# Patient Record
Sex: FEMALE | Race: BLACK OR AFRICAN AMERICAN | NOT HISPANIC OR LATINO | Employment: FULL TIME | URBAN - METROPOLITAN AREA
[De-identification: names, ages, dates, MRNs, and addresses within clinical notes are randomized per-mention and may not be internally consistent; named-entity substitution may affect disease eponyms.]

---

## 2017-10-25 PROBLEM — F44.4 FUNCTIONAL NEUROLOGICAL SYMPTOM DISORDER WITH WEAKNESS OR PARALYSIS: Status: ACTIVE | Noted: 2017-10-25

## 2019-01-28 PROBLEM — M25.519 ACUTE SHOULDER PAIN: Status: RESOLVED | Noted: 2019-01-28 | Resolved: 2019-01-28

## 2019-01-28 PROBLEM — M25.519 ACUTE SHOULDER PAIN: Status: ACTIVE | Noted: 2019-01-28

## 2019-01-28 PROBLEM — M25.512 ACUTE PAIN OF LEFT SHOULDER: Status: RESOLVED | Noted: 2019-01-28 | Resolved: 2019-01-28

## 2020-07-02 ENCOUNTER — OFFICE VISIT (OUTPATIENT)
Dept: GASTROENTEROLOGY | Facility: CLINIC | Age: 40
End: 2020-07-02
Payer: MEDICAID

## 2020-07-02 VITALS
HEIGHT: 65 IN | HEART RATE: 85 BPM | WEIGHT: 293 LBS | BODY MASS INDEX: 48.82 KG/M2 | DIASTOLIC BLOOD PRESSURE: 70 MMHG | SYSTOLIC BLOOD PRESSURE: 118 MMHG

## 2020-07-02 DIAGNOSIS — Z01.818 PREOPERATIVE EXAMINATION: ICD-10-CM

## 2020-07-02 DIAGNOSIS — K59.09 OTHER CONSTIPATION: ICD-10-CM

## 2020-07-02 DIAGNOSIS — K21.9 GASTROESOPHAGEAL REFLUX DISEASE, ESOPHAGITIS PRESENCE NOT SPECIFIED: Primary | ICD-10-CM

## 2020-07-02 PROBLEM — M54.50 LOW BACK PAIN: Status: ACTIVE | Noted: 2020-07-02

## 2020-07-02 PROBLEM — J45.30 MILD PERSISTENT ASTHMA: Status: ACTIVE | Noted: 2019-07-31

## 2020-07-02 PROBLEM — G56.00 CARPAL TUNNEL SYNDROME: Status: ACTIVE | Noted: 2020-05-12

## 2020-07-02 PROBLEM — D50.9 MICROCYTIC ANEMIA: Status: ACTIVE | Noted: 2020-07-02

## 2020-07-02 PROBLEM — E66.01 MORBID OBESITY WITH BMI OF 50.0-59.9, ADULT: Status: ACTIVE | Noted: 2020-07-02

## 2020-07-02 PROCEDURE — 99204 OFFICE O/P NEW MOD 45 MIN: CPT | Mod: S$PBB,,, | Performed by: NURSE PRACTITIONER

## 2020-07-02 PROCEDURE — 99999 PR PBB SHADOW E&M-EST. PATIENT-LVL IV: ICD-10-PCS | Mod: PBBFAC,,, | Performed by: NURSE PRACTITIONER

## 2020-07-02 PROCEDURE — 99999 PR PBB SHADOW E&M-EST. PATIENT-LVL IV: CPT | Mod: PBBFAC,,, | Performed by: NURSE PRACTITIONER

## 2020-07-02 PROCEDURE — 99204 PR OFFICE/OUTPT VISIT, NEW, LEVL IV, 45-59 MIN: ICD-10-PCS | Mod: S$PBB,,, | Performed by: NURSE PRACTITIONER

## 2020-07-02 PROCEDURE — 99214 OFFICE O/P EST MOD 30 MIN: CPT | Mod: PBBFAC,PO | Performed by: NURSE PRACTITIONER

## 2020-07-02 RX ORDER — MEDROXYPROGESTERONE ACETATE 150 MG/ML
INJECTION, SUSPENSION INTRAMUSCULAR
COMMUNITY
Start: 2020-04-20

## 2020-07-02 RX ORDER — FLUTICASONE PROPIONATE 220 UG/1
AEROSOL, METERED RESPIRATORY (INHALATION)
COMMUNITY
Start: 2020-05-12 | End: 2022-06-06

## 2020-07-02 RX ORDER — CIPROFLOXACIN 500 MG/5ML
KIT ORAL
COMMUNITY
Start: 2020-06-23 | End: 2020-08-07

## 2020-07-02 RX ORDER — ALBUTEROL SULFATE 108 UG/1
AEROSOL, METERED RESPIRATORY (INHALATION)
COMMUNITY
Start: 2020-03-26 | End: 2024-02-09

## 2020-07-02 RX ORDER — PANTOPRAZOLE SODIUM 40 MG/1
40 TABLET, DELAYED RELEASE ORAL DAILY
Qty: 30 TABLET | Refills: 11 | Status: SHIPPED | OUTPATIENT
Start: 2020-07-02 | End: 2020-12-12 | Stop reason: CLARIF

## 2020-07-02 NOTE — PROGRESS NOTES
Subjective:       Patient ID: Nanci Bermudez is a 40 y.o. female.    Chief Complaint: Abdominal Pain    39 y/o female referred by PCP for abdominal pain. Patient reports worsening generalized abdominal pain over the past year. Reports burning sensation in upper abdominal region with occasional lower abdominal cramping. Reports occasional heartburn and reflux. Denies dysphagia, n/v, fever, or weight loss. She has history of constipation and has tried OTC laxatives and enemas without much relief. States BMs are usually hard, pebble-like. No bloody or mucus noted.  No previous GI workup.       Past Medical History:   Diagnosis Date    Acute shoulder pain 1/28/2019       History reviewed. No pertinent surgical history.    Family History   Problem Relation Age of Onset    No Known Problems Mother     No Known Problems Father        Social History     Socioeconomic History    Marital status: Single     Spouse name: Not on file    Number of children: Not on file    Years of education: Not on file    Highest education level: Not on file   Occupational History    Not on file   Social Needs    Financial resource strain: Not on file    Food insecurity     Worry: Not on file     Inability: Not on file    Transportation needs     Medical: Not on file     Non-medical: Not on file   Tobacco Use    Smoking status: Never Smoker    Smokeless tobacco: Never Used   Substance and Sexual Activity    Alcohol use: Never     Frequency: Never    Drug use: Never    Sexual activity: Yes   Lifestyle    Physical activity     Days per week: Not on file     Minutes per session: Not on file    Stress: Not on file   Relationships    Social connections     Talks on phone: Not on file     Gets together: Not on file     Attends [employer] service: Not on file     Active member of club or organization: Not on file     Attends meetings of clubs or organizations: Not on file     Relationship status: Not on file   Other Topics Concern    Not  "on file   Social History Narrative    Not on file       Review of Systems   Constitutional: Negative for fatigue, fever and unexpected weight change.   HENT: Negative for sore throat and trouble swallowing.    Respiratory: Negative for cough and shortness of breath.    Cardiovascular: Negative for chest pain.   Gastrointestinal: Positive for abdominal pain and constipation. Negative for blood in stool.   Musculoskeletal: Negative for myalgias.   Allergic/Immunologic: Negative for food allergies.   Neurological: Negative for dizziness and weakness.   Hematological: Negative for adenopathy. Does not bruise/bleed easily.   Psychiatric/Behavioral: Negative for dysphoric mood.         Objective:     Vitals:    07/02/20 0800   BP: 118/70   Pulse: 85   Weight: (!) 162.1 kg (357 lb 4.8 oz)   Height: 5' 5" (1.651 m)          Physical Exam  Constitutional:       General: She is not in acute distress.     Appearance: She is obese. She is not ill-appearing.   HENT:      Head: Normocephalic.   Eyes:      Conjunctiva/sclera: Conjunctivae normal.      Pupils: Pupils are equal, round, and reactive to light.   Cardiovascular:      Rate and Rhythm: Normal rate and regular rhythm.   Pulmonary:      Effort: Pulmonary effort is normal.      Breath sounds: Normal breath sounds.   Abdominal:      General: Bowel sounds are normal.      Palpations: Abdomen is soft.      Tenderness: There is no abdominal tenderness.      Comments: Obese abdomen   Musculoskeletal: Normal range of motion.   Skin:     General: Skin is warm and dry.   Neurological:      Mental Status: She is alert and oriented to person, place, and time.   Psychiatric:         Mood and Affect: Mood normal.         Thought Content: Thought content normal.               Assessment:         ICD-10-CM ICD-9-CM   1. Gastroesophageal reflux disease, esophagitis presence not specified  K21.9 530.81   2. Other constipation  K59.09 564.09   3. Preoperative examination  Z01.818 V72.84 "       Plan:       Gastroesophageal reflux disease, esophagitis presence not specified  -     pantoprazole (PROTONIX) 40 MG tablet; Take 1 tablet (40 mg total) by mouth once daily.  Dispense: 30 tablet; Refill: 11  -     Case request GI: EGD (ESOPHAGOGASTRODUODENOSCOPY)    Other constipation  -     linaCLOtide (LINZESS) 145 mcg Cap capsule; Take 1 capsule (145 mcg total) by mouth once daily.  Dispense: 30 capsule; Refill: 11    Preoperative examination  -     COVID-19 Routine Screening; Future      Follow up if symptoms worsen or fail to improve.     Patient's Medications   New Prescriptions    LINACLOTIDE (LINZESS) 145 MCG CAP CAPSULE    Take 1 capsule (145 mcg total) by mouth once daily.    PANTOPRAZOLE (PROTONIX) 40 MG TABLET    Take 1 tablet (40 mg total) by mouth once daily.   Previous Medications    ALBUTEROL (ACCUNEB) 0.63 MG/3 ML NEBU    Take 3 mLs (0.63 mg total) by nebulization every 6 (six) hours as needed. Rescue    CIPROFLOXACIN (CIPRO) 500 MG/5 ML SUSPENSION        FLUTICASONE PROPIONATE (FLOVENT HFA) 220 MCG/ACTUATION INHALER        IBUPROFEN 800 MG/200 ML (4 MG/ML) PGBK    ibuprofen 800 mg tablet   TAKE 1 TABLET BY MOUTH THREE TIMES DAILY AS NEEDED   take with food    MEDROXYPROGESTERONE (DEPO-PROVERA) 150 MG/ML INJECTION        ONDANSETRON (ZOFRAN-ODT) 4 MG TBDL    Take 1 tablet (4 mg total) by mouth every 6 (six) hours as needed.    PHENAZOPYRIDINE (PYRIDIUM) 200 MG TABLET    Take 1 tablet (200 mg total) by mouth 3 (three) times daily. for 10 days    PROVENTIL HFA 90 MCG/ACTUATION INHALER       Modified Medications    No medications on file   Discontinued Medications    ACETAMINOPHEN (TYLENOL) 500 MG TABLET    Take 2 tablets (1,000 mg total) by mouth every 6 (six) hours as needed.

## 2020-07-02 NOTE — PATIENT INSTRUCTIONS
Abdominal Pain    Abdominal pain is pain in the stomach or belly area. Everyone has this pain from time to time. In many cases it goes away on its own. But abdominal pain can sometimes be due to a serious problem, such as appendicitis. So its important to know when to seek help.  Causes of abdominal pain  There are many possible causes of abdominal pain. Common causes in adults include:  · Constipation, diarrhea, or gas  · Stomach acid flowing back up into the esophagus (acid reflux or heartburn)  · Severe acid reflux, called GERD (gastroesophageal reflux disease)  · A sore in the lining of the stomach or small intestine (peptic ulcer)  · Inflammation of the gallbladder, liver, or pancreas  · Gallstones or kidney stones  · Appendicitis   · Intestinal blockage   · An internal organ pushing through a muscle or other tissue (hernia)  · Urinary tract infections  · In women, menstrual cramps, fibroids, or endometriosis  · Inflammation or infection of the intestines  Diagnosing the cause of abdominal pain  Your healthcare provider will do a physical exam help find the cause of your pain. If needed, tests will be ordered. Belly pain has many possible causes. So it can be hard to find the reason for your pain. Giving details about your pain can help. Tell your provider where and when you feel the pain, and what makes it better or worse. Also let your provider know if you have other symptoms such as:  · Fever  · Tiredness  · Upset stomach (nausea)  · Vomiting  · Changes in bathroom habits  Treating abdominal pain  Some causes of pain need emergency medical treatment right away. These include appendicitis or a bowel blockage. Other problems can be treated with rest, fluids, or medicines. Your healthcare provider can give you specific instructions for treatment or self-care based on what is causing your pain.  If you have vomiting or diarrhea, sip water or other clear fluids. When you are ready to eat solid foods again,  start with small amounts of easy-to-digest, low-fat foods. These include apple sauce, toast, or crackers.   When to seek medical care  Call 911 or go to the hospital right away if you:  · Cant pass stool and are vomiting  · Are vomiting blood or have bloody diarrhea or black, tarry diarrhea  · Have chest, neck, or shoulder pain  · Feel like you might pass out  · Have pain in your shoulder blades with nausea  · Have sudden, severe belly pain  · Have new, severe pain unlike any you have felt before  · Have a belly that is rigid, hard, and tender to touch  Call your healthcare provider if you have:  · Pain for more than 5 days  · Bloating for more than 2 days  · Diarrhea for more than 5 days  · A fever of 100.4°F (38.0°C) or higher, or as directed by your provider  · Pain that gets worse  · Weight loss for no reason  · Continued lack of appetite  · Blood in your stool  How to prevent abdominal pain  Here are some tips to help prevent abdominal pain:  · Eat smaller amounts of food at one time.  · Avoid greasy, fried, or other high-fat foods.  · Avoid foods that give you gas.  · Exercise regularly.  · Drink plenty of fluids.  To help prevent GERD symptoms:  · Quit smoking.  · Reduce alcohol and certain foods that increase stomach acid.  · Avoid aspirin and over-the-counter pain and fever medicines (NSAIDS or nonsteroidal anti-inflammatory drugs), if possible  · Lose extra weight.  · Finish eating at least 2 hours before you go to bed or lie down.  · Raise the head of your bed.  Date Last Reviewed: 7/1/2016  © 3614-7967 [employer]. [address] [place] [address], [place] [address]. All rights reserved. This information is not intended as a substitute for professional medical care. Always follow your healthcare professional's instructions.      EGD Prep Instructions    [facility]  [address] in [facility] Office [phone]  Endoscopy Lab [phone]    You are scheduled  for an EGD with Dr. Richard on 8/11/20 at [facility].  You will enter through the [facility] Entrance and check in at Same Day Surgery.    Nothing to eat or drink after midnight before the procedure.  You MAY brush your teeth.    You MAY take your blood pressure, heart, and seizure medication on the morning of the procedure, with a SIP of water.  Hold ALL other medications until after the procedure.    If you are on blood thinners THAT YOU HAVE BEEN INSTRUCTED TO HOLD BY YOUR DOCTOR FOR THIS PROCEDURE, then do NOT take this the morning of your EGD.  Do NOT stop these medications on your own, they must be approved to be held by your doctor.  Your EGD can NOT be done if you are on these medications.  Examples of blood thinners include: Coumadin, Aggrenox, Plavix, Pradaxa, Reapro, Pletal, Xarelto, Ticagrelor, Brilinta, Eliquis, and high dose aspirin (325 mg).  You do not have to stop baby aspirin 81 mg.    You will receive a call 2-3 days before your EGD to tell you the time to arrive.  If you have not received a call by the day before your procedure, call the Endoscopy Lab at [phone].

## 2020-07-02 NOTE — LETTER
July 2, 2020      Char Hand MD  [address] [facility]  [address]  [facility] [address]           [facility]  [facility], [place]   [facility] [address]  Phone: [phone]  Fax: [phone]          Patient: Nanci Bermudez   MR Number: [id number]   YOB: 1980   Date of Visit: 7/2/2020       Dear Dr. Char Hand:    Thank you for referring Nanci Bermudez to me for evaluation. Attached you will find relevant portions of my assessment and plan of care.    If you have questions, please do not hesitate to call me. I look forward to following Nanci Bermudez along with you.    Sincerely,    Mike Palacios, ALAINA    Enclosure  CC:  No Recipients    If you would like to receive this communication electronically, please contact [email or web address] or [phone] to request more information on [employer] Link access.    For providers and/or their staff who would like to refer a patient to [facility], please contact us through our one-stop-shop provider referral line, [facility] , at [phone].    If you feel you have received this communication in error or would no longer like to receive these types of communications, please e-mail [email or web address]

## 2020-08-08 ENCOUNTER — LAB VISIT (OUTPATIENT)
Dept: FAMILY MEDICINE | Facility: CLINIC | Age: 40
End: 2020-08-08
Payer: MEDICAID

## 2020-08-08 DIAGNOSIS — Z01.818 PREOPERATIVE EXAMINATION: ICD-10-CM

## 2020-08-08 PROCEDURE — U0003 INFECTIOUS AGENT DETECTION BY NUCLEIC ACID (DNA OR RNA); SEVERE ACUTE RESPIRATORY SYNDROME CORONAVIRUS 2 (SARS-COV-2) (CORONAVIRUS DISEASE [COVID-19]), AMPLIFIED PROBE TECHNIQUE, MAKING USE OF HIGH THROUGHPUT TECHNOLOGIES AS DESCRIBED BY CMS-2020-01-R: HCPCS

## 2020-08-09 LAB — SARS-COV-2 RNA RESP QL NAA+PROBE: NOT DETECTED

## 2020-08-11 PROBLEM — K21.9 GERD (GASTROESOPHAGEAL REFLUX DISEASE): Status: ACTIVE | Noted: 2020-08-11

## 2020-08-26 ENCOUNTER — TELEPHONE (OUTPATIENT)
Dept: GASTROENTEROLOGY | Facility: CLINIC | Age: 40
End: 2020-08-26

## 2020-08-26 DIAGNOSIS — B96.81 HELICOBACTER PYLORI GASTRITIS: Primary | ICD-10-CM

## 2020-08-26 DIAGNOSIS — K29.70 HELICOBACTER PYLORI GASTRITIS: Primary | ICD-10-CM

## 2020-08-26 RX ORDER — METRONIDAZOLE 500 MG/1
500 TABLET ORAL 3 TIMES DAILY
Qty: 42 TABLET | Refills: 0 | Status: SHIPPED | OUTPATIENT
Start: 2020-08-26 | End: 2020-09-09

## 2020-08-26 RX ORDER — OMEPRAZOLE 40 MG/1
40 CAPSULE, DELAYED RELEASE ORAL
Qty: 28 CAPSULE | Refills: 0 | Status: SHIPPED | OUTPATIENT
Start: 2020-08-26 | End: 2021-05-13

## 2020-08-26 RX ORDER — DOXYCYCLINE HYCLATE 100 MG
100 TABLET ORAL 2 TIMES DAILY
Qty: 28 TABLET | Refills: 0 | Status: SHIPPED | OUTPATIENT
Start: 2020-08-26 | End: 2020-09-09

## 2020-08-26 RX ORDER — BISMUTH SUBSALICYLATE 262 MG/1
2 TABLET ORAL
Qty: 112 TABLET | Refills: 0 | Status: SHIPPED | OUTPATIENT
Start: 2020-08-26 | End: 2020-09-09

## 2020-08-26 NOTE — TELEPHONE ENCOUNTER
Informed patient of positive H Pylori. Also informed patient of medications that need to be taken together and for how long to take it. Stressed the importance of this. Told patient to stop pantoprazole and take omeprazole during treatment. Patient understood.

## 2020-08-26 NOTE — TELEPHONE ENCOUNTER
Hyperplastic gastric polyp, not of concern.  HP (+).  Meds were sent to [facility] b/c it looks like the abx need PA.  I have asked them to mail to her, but she also needs to ask when they call her.  She can stop her pantoprazole during treatment and do the omeprazole instead.  Do ALL 4 of these meds together, do not miss doses.  RTC as needed.

## 2021-05-13 ENCOUNTER — OFFICE VISIT (OUTPATIENT)
Dept: PRIMARY CARE CLINIC | Facility: CLINIC | Age: 41
End: 2021-05-13
Payer: COMMERCIAL

## 2021-05-13 DIAGNOSIS — K21.9 GASTROESOPHAGEAL REFLUX DISEASE WITHOUT ESOPHAGITIS: ICD-10-CM

## 2021-05-13 DIAGNOSIS — D50.9 MICROCYTIC ANEMIA: ICD-10-CM

## 2021-05-13 DIAGNOSIS — J01.40 ACUTE PANSINUSITIS, RECURRENCE NOT SPECIFIED: Primary | ICD-10-CM

## 2021-05-13 DIAGNOSIS — J45.30 MILD PERSISTENT ASTHMA WITHOUT COMPLICATION: ICD-10-CM

## 2021-05-13 PROCEDURE — 99214 PR OFFICE/OUTPT VISIT, EST, LEVL IV, 30-39 MIN: ICD-10-PCS | Mod: 95,,, | Performed by: FAMILY MEDICINE

## 2021-05-13 PROCEDURE — 99214 OFFICE O/P EST MOD 30 MIN: CPT | Mod: 95,,, | Performed by: FAMILY MEDICINE

## 2021-05-13 RX ORDER — CETIRIZINE HYDROCHLORIDE, PSEUDOEPHEDRINE HYDROCHLORIDE 5; 120 MG/1; MG/1
1 TABLET, FILM COATED, EXTENDED RELEASE ORAL 2 TIMES DAILY
Qty: 20 TABLET | Refills: 0 | Status: SHIPPED | OUTPATIENT
Start: 2021-05-13 | End: 2021-05-23

## 2021-05-13 RX ORDER — METHYLPREDNISOLONE 4 MG/1
TABLET ORAL
Qty: 1 PACKAGE | Refills: 0 | Status: SHIPPED | OUTPATIENT
Start: 2021-05-13 | End: 2021-08-16 | Stop reason: CLARIF

## 2022-06-03 DIAGNOSIS — J45.30 MILD PERSISTENT ASTHMA WITHOUT COMPLICATION: Primary | ICD-10-CM

## 2022-06-06 ENCOUNTER — HOSPITAL ENCOUNTER (OUTPATIENT)
Dept: RADIOLOGY | Facility: HOSPITAL | Age: 42
Discharge: HOME | End: 2022-06-06
Attending: INTERNAL MEDICINE
Payer: MEDICAID

## 2022-06-06 ENCOUNTER — HOSPITAL ENCOUNTER (OUTPATIENT)
Dept: PULMONOLOGY | Facility: CLINIC | Age: 42
Discharge: HOME | End: 2022-06-06
Payer: MEDICAID

## 2022-06-06 ENCOUNTER — OFFICE VISIT (OUTPATIENT)
Dept: PULMONOLOGY | Facility: CLINIC | Age: 42
End: 2022-06-06
Payer: MEDICAID

## 2022-06-06 VITALS — HEIGHT: 65 IN | OXYGEN SATURATION: 99 % | HEART RATE: 88 BPM | BODY MASS INDEX: 48.82 KG/M2 | WEIGHT: 293 LBS

## 2022-06-06 DIAGNOSIS — J45.20 MILD INTERMITTENT ASTHMA, UNSPECIFIED WHETHER COMPLICATED: Primary | ICD-10-CM

## 2022-06-06 DIAGNOSIS — J45.30 MILD PERSISTENT ASTHMA WITHOUT COMPLICATION: ICD-10-CM

## 2022-06-06 PROCEDURE — 94727 PR PULM FUNCTION TEST BY GAS: ICD-10-PCS | Mod: 26,S$PBB,, | Performed by: INTERNAL MEDICINE

## 2022-06-06 PROCEDURE — 94727 GAS DIL/WSHOT DETER LNG VOL: CPT | Mod: PBBFAC | Performed by: INTERNAL MEDICINE

## 2022-06-06 PROCEDURE — 94729 DIFFUSING CAPACITY: CPT | Mod: 26,S$PBB,, | Performed by: INTERNAL MEDICINE

## 2022-06-06 PROCEDURE — 94729 DIFFUSING CAPACITY: CPT | Mod: PBBFAC | Performed by: INTERNAL MEDICINE

## 2022-06-06 PROCEDURE — 71046 X-RAY EXAM CHEST 2 VIEWS: CPT | Mod: TC,FY

## 2022-06-06 PROCEDURE — 99204 PR OFFICE/OUTPT VISIT, NEW, LEVL IV, 45-59 MIN: ICD-10-PCS | Mod: S$PBB,,, | Performed by: INTERNAL MEDICINE

## 2022-06-06 PROCEDURE — 94727 GAS DIL/WSHOT DETER LNG VOL: CPT | Mod: 26,S$PBB,, | Performed by: INTERNAL MEDICINE

## 2022-06-06 PROCEDURE — 94729 PR C02/MEMBANE DIFFUSE CAPACITY: ICD-10-PCS | Mod: 26,S$PBB,, | Performed by: INTERNAL MEDICINE

## 2022-06-06 PROCEDURE — 3008F PR BODY MASS INDEX (BMI) DOCUMENTED: ICD-10-PCS | Mod: CPTII,,, | Performed by: INTERNAL MEDICINE

## 2022-06-06 PROCEDURE — 99999 PR PBB SHADOW E&M-EST. PATIENT-LVL III: CPT | Mod: PBBFAC,,, | Performed by: INTERNAL MEDICINE

## 2022-06-06 PROCEDURE — 71046 XR CHEST PA AND LATERAL: ICD-10-PCS | Mod: 26,,, | Performed by: RADIOLOGY

## 2022-06-06 PROCEDURE — 99999 PR PBB SHADOW E&M-EST. PATIENT-LVL III: ICD-10-PCS | Mod: PBBFAC,,, | Performed by: INTERNAL MEDICINE

## 2022-06-06 PROCEDURE — 99213 OFFICE O/P EST LOW 20 MIN: CPT | Mod: PBBFAC,25 | Performed by: INTERNAL MEDICINE

## 2022-06-06 PROCEDURE — 3008F BODY MASS INDEX DOCD: CPT | Mod: CPTII,,, | Performed by: INTERNAL MEDICINE

## 2022-06-06 PROCEDURE — 71046 X-RAY EXAM CHEST 2 VIEWS: CPT | Mod: 26,,, | Performed by: RADIOLOGY

## 2022-06-06 PROCEDURE — 99204 OFFICE O/P NEW MOD 45 MIN: CPT | Mod: S$PBB,,, | Performed by: INTERNAL MEDICINE

## 2022-06-06 PROCEDURE — 94010 BREATHING CAPACITY TEST: CPT | Mod: 53,PBBFAC | Performed by: INTERNAL MEDICINE

## 2022-06-06 RX ORDER — FLUTICASONE PROPIONATE AND SALMETEROL 50; 500 UG/1; UG/1
1 POWDER RESPIRATORY (INHALATION) 2 TIMES DAILY
COMMUNITY
Start: 2022-04-04

## 2022-06-06 RX ORDER — MONTELUKAST SODIUM 10 MG/1
10 TABLET ORAL NIGHTLY
COMMUNITY
Start: 2022-04-09

## 2022-06-06 NOTE — PROGRESS NOTES
Subjective:      Patient ID: Nanci Bermudez is a 42 y.o. female.    Chief Complaint: Asthma    HPI  41 yo female who works at [employer] comes for assessment of mild asthma. She complains of exertional dyspnea, states worse climbing the stairs in her home     She is morbidly obese: Weight 387 pounds,  BMI:64.49   Chest x-ray is clear with poor inflation secondary to restriction in diaphragmatic movement.  Review of Systems   Constitutional: Negative.    HENT: Negative.    Eyes: Negative.    Respiratory: Positive for dyspnea on extertion.    Cardiovascular: Negative.    Genitourinary: Negative.    Musculoskeletal: Negative.    Skin: Negative.    Gastrointestinal: Negative.    Neurological: Negative.    Psychiatric/Behavioral: Negative.      Objective:     Physical Exam  Vitals and nursing note reviewed.   Constitutional:       General: She is not in acute distress.     Appearance: She is well-developed. She is obese.      Comments: Morbidly obese.. Weighed 230 pounds in High School   HENT:      Head: Normocephalic and atraumatic.      Right Ear: External ear normal.      Left Ear: External ear normal.      Nose: Nose normal.   Eyes:      Conjunctiva/sclera: Conjunctivae normal.      Pupils: Pupils are equal, round, and reactive to light.   Neck:      Thyroid: No thyromegaly.      Vascular: No JVD.   Cardiovascular:      Rate and Rhythm: Normal rate and regular rhythm.      Heart sounds: Normal heart sounds. No murmur heard.    No gallop.   Pulmonary:      Effort: No respiratory distress.      Breath sounds: Normal breath sounds. No stridor. No wheezing or rales.      Comments:   FVC: 76.5%  (77%)  2.50 liters    DLCO/%    Peak flow: 350 L/min    Chest x-ray is clear, Poor inspiratory effort     Resting Sa02: 98%  No change when she walked the length of the mccoy and back  Chest:      Chest wall: No tenderness.   Abdominal:      General: Bowel sounds are normal. There is no distension.      Palpations:  Abdomen is soft. There is no mass.      Tenderness: There is no abdominal tenderness. There is no guarding or rebound.   Musculoskeletal:         General: Normal range of motion.      Cervical back: Normal range of motion and neck supple.   Lymphadenopathy:      Cervical: No cervical adenopathy.   Skin:     General: Skin is warm and dry.      Findings: No rash.   Neurological:      Mental Status: She is alert and oriented to person, place, and time.      Cranial Nerves: No cranial nerve deficit.      Deep Tendon Reflexes: Reflexes are normal and symmetric. Reflexes normal.   Psychiatric:         Behavior: Behavior normal.         Thought Content: Thought content normal.         Judgment: Judgment normal.         Assessment:     1. Mild intermittent asthma, unspecified whether complicated      Outpatient Encounter Medications as of 6/6/2022   Medication Sig Dispense Refill    ADVAIR DISKUS 500-50 mcg/dose DsDv diskus inhaler 1 puff 2 (two) times daily.      montelukast (SINGULAIR) 10 mg tablet Take 10 mg by mouth every evening.      medroxyPROGESTERone (DEPO-PROVERA) 150 mg/mL injection       PROVENTIL HFA 90 mcg/actuation inhaler       [DISCONTINUED] fluticasone propionate (FLOVENT HFA) 220 mcg/actuation inhaler        No facility-administered encounter medications on file as of 6/6/2022.     No orders of the defined types were placed in this encounter.    Plan:   Referred to the [facility]ariat[facility] Clinic for evaluation  Problem List Items Addressed This Visit    None     Visit Diagnoses     Mild intermittent asthma, unspecified whether complicated    -  Primary        Patient does NOT have asthma  And will not imrpove with bronchodilators    SHE IS MORBIDLY OBESE, HAD LABS DRAWN LAST WEEK AT [facility],NOT ACCESSIBLE  SHE DOES NOT HAVE ASTHMA   HER HEALTH ISSUES ARE SECONDARY TO HER BMI OF 64.49!

## 2024-01-31 ENCOUNTER — PATIENT MESSAGE (OUTPATIENT)
Dept: OBSTETRICS AND GYNECOLOGY | Facility: CLINIC | Age: 44
End: 2024-01-31
Payer: MEDICAID

## 2024-02-09 ENCOUNTER — OFFICE VISIT (OUTPATIENT)
Dept: GASTROENTEROLOGY | Facility: CLINIC | Age: 44
End: 2024-02-09
Payer: MEDICAID

## 2024-02-09 VITALS
OXYGEN SATURATION: 98 % | HEART RATE: 82 BPM | DIASTOLIC BLOOD PRESSURE: 74 MMHG | BODY MASS INDEX: 48.82 KG/M2 | WEIGHT: 293 LBS | SYSTOLIC BLOOD PRESSURE: 109 MMHG | HEIGHT: 65 IN

## 2024-02-09 DIAGNOSIS — R11.2 NAUSEA AND VOMITING, UNSPECIFIED VOMITING TYPE: Primary | ICD-10-CM

## 2024-02-09 DIAGNOSIS — K29.70 GASTRITIS, HELICOBACTER PYLORI: ICD-10-CM

## 2024-02-09 DIAGNOSIS — B96.81 GASTRITIS, HELICOBACTER PYLORI: ICD-10-CM

## 2024-02-09 DIAGNOSIS — Z98.84 S/P LAPAROSCOPIC SLEEVE GASTRECTOMY: ICD-10-CM

## 2024-02-09 DIAGNOSIS — R10.9 ABDOMINAL PAIN, UNSPECIFIED ABDOMINAL LOCATION: ICD-10-CM

## 2024-02-09 PROCEDURE — 3078F DIAST BP <80 MM HG: CPT | Mod: CPTII,,, | Performed by: NURSE PRACTITIONER

## 2024-02-09 PROCEDURE — 99204 OFFICE O/P NEW MOD 45 MIN: CPT | Mod: S$PBB,,, | Performed by: NURSE PRACTITIONER

## 2024-02-09 PROCEDURE — 99999 PR PBB SHADOW E&M-EST. PATIENT-LVL IV: CPT | Mod: PBBFAC,,, | Performed by: NURSE PRACTITIONER

## 2024-02-09 PROCEDURE — 3008F BODY MASS INDEX DOCD: CPT | Mod: CPTII,,, | Performed by: NURSE PRACTITIONER

## 2024-02-09 PROCEDURE — 3074F SYST BP LT 130 MM HG: CPT | Mod: CPTII,,, | Performed by: NURSE PRACTITIONER

## 2024-02-09 PROCEDURE — 1159F MED LIST DOCD IN RCRD: CPT | Mod: CPTII,,, | Performed by: NURSE PRACTITIONER

## 2024-02-09 PROCEDURE — 99214 OFFICE O/P EST MOD 30 MIN: CPT | Mod: PBBFAC,PN | Performed by: NURSE PRACTITIONER

## 2024-02-09 RX ORDER — ALBUTEROL SULFATE 0.83 MG/ML
SOLUTION RESPIRATORY (INHALATION)
COMMUNITY

## 2024-02-09 RX ORDER — PANTOPRAZOLE SODIUM 40 MG/1
40 TABLET, DELAYED RELEASE ORAL DAILY
Qty: 30 TABLET | Refills: 11 | Status: SHIPPED | OUTPATIENT
Start: 2024-02-09 | End: 2025-02-08

## 2024-02-09 RX ORDER — NYSTATIN AND TRIAMCINOLONE ACETONIDE 100000; 1 [USP'U]/G; MG/G
OINTMENT TOPICAL 2 TIMES DAILY
COMMUNITY
Start: 2024-01-14 | End: 2025-01-13

## 2024-02-09 RX ORDER — SUCRALFATE 1 G/1
1 TABLET ORAL 3 TIMES DAILY
Qty: 90 TABLET | Refills: 2 | Status: SHIPPED | OUTPATIENT
Start: 2024-02-09 | End: 2024-05-09

## 2024-02-09 RX ORDER — ALBUTEROL SULFATE 90 UG/1
2 AEROSOL, METERED RESPIRATORY (INHALATION) EVERY 4 HOURS PRN
COMMUNITY

## 2024-02-09 RX ORDER — FLUTICASONE PROPIONATE 50 MCG
SPRAY, SUSPENSION (ML) NASAL
COMMUNITY

## 2024-02-09 NOTE — PROGRESS NOTES
"     GASTROENTEROLOGY CLINIC NOTE    Chief Complaint: The primary encounter diagnosis was Nausea and vomiting, unspecified vomiting type. Diagnoses of Gastritis, Helicobacter pylori, S/P laparoscopic sleeve gastrectomy, and Abdominal pain, unspecified abdominal location were also pertinent to this visit.  Referring provider/PCP: Char Hand MD    Nanci Bermudez is a 43 y.o. female who is a new patient to me who presents to clinic for h.pylori, nausea, vomiting, abdominal pain, and s/p sleeve gastrectomy 12/7/2023. She is accompanied by her .     Sleeve gastrectomy 12/7/2023 at [facility].   H.pylori noted with pathology from EGD 2020; treated.   Breath test positive 5/2023; treated. Eradication not confirmed.     Has followed with surgeon who performed surgery. Patient reports being told symptoms are not related to surgery.    CT 12/10 with questionable sludge in gallbladder  CT 1/26 fatty liver  Ultrasound 1/26 1 cm polyp vs stone in neck of gallbladder; no CBD dilation    Reflux: Not currently; more reflux after surgery   Dysphagia/Odynophagia: Feels like food sticking in chest and not moving. Not as bad with liquid. Began occurring when diet advanced from liquid to soft foods following sleeve surgery.   Nausea/Vomiting: Nausea and vomiting since sleeve in December  Vomiting about 30-45 minutes after eating. Food and liquid.     Appetite Changes: Decreased appetite   Abdominal Pain: Right side of abdomen over incision. Described as sharp shooting pain that radiates across to the left. Constant. Described as "someone poking me with a fork." Standing up eases the pain. No change with eating or bowel movements.   She has tried dicyclomine for pain without improvement.     Bowel Habits: started fiber and getting more regular.   GI Bleeding: Denies hematochezia, hematemesis, melena, BRBPR, Black/tarry stool, coffee ground emesis  Unexplained Weight Loss: No     GLP-1s: No  NSAIDs: No  Anticoagulation or " Antiplatelet: No    History of H.pylori: Yes 2020 Path Positive, 2023 Breath Test Positive  H.pylori Treatment:2020 Flagyl, Doxy, Bismuth, Omeprazole  5/2023 Pylera    Prior Upper Endoscopy: 8/2020 with Dr. Richard  Impression:           - Normal esophagus.                         - Erythematous mucosa in the antrum. Biopsied.                         - A single 10 mmgastric polyp. Resected and retrieved.                         - Normal examined duodenum.     Pathology:  1.  Stomach, antrum, polyp, biopsy:   - Hyperplastic polyp with  H. pylori  infection, see comment.     2.  Stomach, antrum, biopsy:   - Antral mucosa with  H. pylori  associated active chronic gastritis, see   comment.   COMMENTS: Immunostains for   H. pylori  performed on specimens 1 and 2 are POSITIVE.  Appropriate positive   controls are examined.     Prior Colonoscopy: no  Family h/o Colon Cancer: No  Family h/o Crohn's Disease or Ulcerative Colitis: No  Family h/o Celiac Sprue: No  Abdominal Surgeries:  12/7/2023 Sleeve, 2012 tubal      Review of Systems   Constitutional:  Positive for malaise/fatigue. Negative for weight loss.   HENT:  Negative for sore throat.    Eyes:  Negative for blurred vision.   Respiratory:  Negative for cough.    Cardiovascular:  Negative for chest pain.   Gastrointestinal:  Positive for abdominal pain, nausea and vomiting. Negative for blood in stool, constipation, diarrhea, heartburn and melena.   Genitourinary:  Negative for dysuria.   Musculoskeletal:  Negative for myalgias.   Skin:  Negative for rash.   Neurological:  Negative for headaches.   Endo/Heme/Allergies:  Negative for environmental allergies.   Psychiatric/Behavioral:  Negative for suicidal ideas. The patient is not nervous/anxious.        Past Medical History: has a past medical history of Acute shoulder pain and Asthma.    Past Surgical History: has a past surgical history that includes Bilateral tubal ligation; Esophagogastroduodenoscopy (N/A,  08/11/2020); and gastric sleeve (12/07/2023).    Family History:family history includes No Known Problems in her father and mother.    Allergies:   Review of patient's allergies indicates:   Allergen Reactions    Iohexol Swelling    Keflex [cephalexin] Swelling       Social History: reports that she has never smoked. She has never used smokeless tobacco. She reports that she does not drink alcohol and does not use drugs.    Home medications:   Current Outpatient Medications on File Prior to Visit   Medication Sig Dispense Refill    ADVAIR DISKUS 500-50 mcg/dose DsDv diskus inhaler 1 puff 2 (two) times daily.      albuterol (PROVENTIL) 2.5 mg /3 mL (0.083 %) nebulizer solution USE 1 VIAL IN NEBULIZER 4 TIMES DAILY AS NEEDED FOR COUGH AND FOR SHORTNESS OF BREATH      albuterol (PROVENTIL/VENTOLIN HFA) 90 mcg/actuation inhaler Inhale 2 puffs into the lungs every 4 (four) hours as needed.      fluticasone propionate (FLONASE) 50 mcg/actuation nasal spray fluticasone propionate 50 mcg/actuation nasal spray,suspension   SHAKE LIQUID AND USE 1 SPRAY IN EACH NOSTRIL EVERY DAY      medroxyPROGESTERone (DEPO-PROVERA) 150 mg/mL injection       montelukast (SINGULAIR) 10 mg tablet Take 10 mg by mouth every evening.      nystatin-triamcinolone (MYCOLOG) ointment Apply topically 2 (two) times daily.      [DISCONTINUED] famotidine (PEPCID) 20 MG tablet Take 1 tablet (20 mg total) by mouth 2 (two) times daily. for 14 days 28 tablet 0    [DISCONTINUED] metoclopramide HCl (REGLAN) 10 MG tablet Take 1 tablet (10 mg total) by mouth every 6 (six) hours as needed (indigestion, nausea/vomiting). 20 tablet 0    [DISCONTINUED] aluminum & magnesium hydroxide-simethicone (MYLANTA MAXIMUM STRENGTH) 400-400-40 mg/5 mL suspension Take 15 mLs by mouth every 6 (six) hours as needed for Indigestion. (Patient not taking: Reported on 2/9/2024) 100 mL 0    [DISCONTINUED] bismuth-metronidazole-tetracycline (PYLERA) 140-125-125 mg per capsule Pylera 140  "mg-125 mg-125 mg capsule   TAKE 3 CAPSULES BY MOUTH FOUR TIMES DAILY WITH MEALS      [DISCONTINUED] dicyclomine (BENTYL) 20 mg tablet Take 1 tablet (20 mg total) by mouth 2 (two) times daily. (Patient not taking: Reported on 2/9/2024) 20 tablet 0    [DISCONTINUED] pantoprazole (PROTONIX) 20 MG tablet Take 1 tablet (20 mg total) by mouth 2 (two) times daily before meals. for 14 days 28 tablet 0    [DISCONTINUED] promethazine (PHENERGAN) 25 MG tablet Take 1 tablet (25 mg total) by mouth every 6 (six) hours as needed for Nausea. 20 tablet 0    [DISCONTINUED] PROVENTIL HFA 90 mcg/actuation inhaler        No current facility-administered medications on file prior to visit.       Vital signs:  /74 (BP Location: Right forearm, Patient Position: Sitting, BP Method: Medium (Automatic))   Pulse 82   Ht 5' 5" (1.651 m)   Wt (!) 158.3 kg (348 lb 14.1 oz)   LMP 12/12/2023   SpO2 98%   BMI 58.06 kg/m²     Physical Exam  Vitals reviewed.   Constitutional:       Appearance: Normal appearance.   HENT:      Head: Normocephalic.   Pulmonary:      Effort: Pulmonary effort is normal. No respiratory distress.   Abdominal:      General: There is no distension.      Palpations: Abdomen is soft.      Tenderness: There is no abdominal tenderness.          Comments: Incision from sleeve gastrectomy well healed. Pain noted at incision site.    Skin:     General: Skin is warm.   Neurological:      Mental Status: She is alert and oriented to person, place, and time.   Psychiatric:         Behavior: Behavior normal.         Thought Content: Thought content normal.         Routine labs:  Lab Results   Component Value Date    WBC 5.93 01/26/2024    HGB 10.7 (L) 01/26/2024    HCT 35.0 (L) 01/26/2024    MCV 79 (L) 01/26/2024     01/26/2024     No results found for: "INR"  No results found for: "IRON", "FERRITIN", "TIBC", "FESATURATED"  Lab Results   Component Value Date     01/26/2024    K 3.5 01/26/2024     (H) " "01/26/2024    CO2 19 (L) 01/26/2024    BUN 8 01/26/2024    CREATININE 0.9 01/26/2024     Lab Results   Component Value Date    ALBUMIN 3.7 01/26/2024    ALT 15 01/26/2024    AST 14 01/26/2024    ALKPHOS 81 01/26/2024    BILITOT 0.5 01/26/2024     No results found for: "GLUCOSE"  Lab Results   Component Value Date    TSH 1.06 04/26/2019     Lab Results   Component Value Date    CALCIUM 9.3 01/26/2024       Imaging:  CT 12/10/2023  Findings:   Thorax:   Lungs: Subsegmental atelectasis is seen at the left lung base.   Pleura: A small left pleural effusion is noted.   Heart: The heart size is within normal limits.   Abdomen:   Abdominal Wall: There is focal moderate subcutaneous emphysema in the left ventral abdominal wall (series 2; image 37), likely from recent surgery. No intraperitoneal extension is identified.   Liver: The liver appears unremarkable.   Biliary System: No intrahepatic or extrahepatic biliary duct dilatation is seen.   Gallbladder: Dependent hyperdensity is seen in the gallbladder which may represent sludge. The gallbladder otherwise appears unremarkable.   Pancreas: The pancreas appears unremarkable.   Spleen: The spleen appears unremarkable.   Adrenals: The adrenal glands appear unremarkable.   Kidneys: The kidneys appear unremarkable with no stones cysts masses or hydronephrosis.   Aorta: The abdominal aorta appears unremarkable.   IVC: Unremarkable.   Bowel:   Esophagus: The visualized esophagus appears unremarkable.   Stomach: There is suggestion of recent gastric sleeve surgery with mild perigastric fat stranding. No fluid collection or hematoma is identified.   Duodenum: Unremarkable appearing duodenum.   Small Bowel: The small bowel appears unremarkable.   Colon: Nondistended.   Appendix: The appendix appears unremarkable (series 2; images 67-76).   Peritoneum: No intraperitoneal free air or ascites is seen.     Pelvis:   Bladder: The bladder appears unremarkable.   Female:   Uterus: The " uterus appears unremarkable.   Ovaries: No adnexal masses are seen.     Bony structures:   Dorsal Spine: The visualized dorsal spine appears unremarkable.   Bony Pelvis: The visualized bony structures of the pelvis appear unremarkable.     US Abdomen Limited_Gallbladder  Narrative: EXAMINATION:  US ABDOMEN LIMITED_GALLBLADDER    CLINICAL HISTORY:  RUQ pain;    TECHNIQUE:  Limited ultrasound of the right upper quadrant of the abdomen focused on the biliary system was performed.    COMPARISON:  Ultrasound 05/29/2014, CT 01/26/2024    FINDINGS:  Gallbladder: 1 cm echogenic focus at the level of the gallbladder neck, possibly a polyp or are head and stone.  No wall thickening or pericholecystic fluid.  Negative sonographic Blake's sign.    Biliary system: The common duct is not dilated, measuring 4 mm.  No intrahepatic ductal dilatation.    Pancreas: The visualized portions of pancreas appear normal.    Miscellaneous: No upper abdominal ascites and no abnormalities of the visualized liver.  Impression: 1 cm echogenic focus at the level of the gallbladder neck, possibly an adherent stone or polyp.  No sonographic findings to suggest acute cholecystitis.    Electronically signed by: Roberto Zhao MD  Date:    01/26/2024  Time:    08:20    CT Abdomen Pelvis  Without Contrast  Narrative: EXAMINATION:  CT ABDOMEN PELVIS WITHOUT CONTRAST    CLINICAL HISTORY:  Abdominal pain, post-op;RUQ pain, gastric sleeve 7 weeks ago;    TECHNIQUE:  Low dose axial images, sagittal and coronal reformations were obtained from the lung bases to the pubic symphysis.  No oral or IV contrast.    COMPARISON:  Renal stone study 12/19/2019    FINDINGS:  The visualized lung bases are free of pleural fluid or focal consolidation.  The visualized portions of the heart and pericardium are within normal limits.    Please note evaluation of solid organ parenchyma is limited due to lack of IV contrast.  The liver demonstrates a region of ill-defined  hypoattenuation about the falciform ligament likely reflecting focal fatty deposition.  There are no calcified stones in the gallbladder lumen.  The spleen, pancreas and adrenal glands demonstrate an unremarkable noncontrast CT appearance.  Incidentally noted small splenule present.    The kidneys are normal in size and location.  No evidence of hydronephrosis or nephrolithiasis.  The ureters appear normal in caliber and course.  The urinary bladder is unremarkable.  Small calcifications in the pelvis presumably reflect phleboliths.  The uterus and adnexal regions are within normal limits.  There is a fat containing left inguinal hernia.    There is postoperative change of interval sleeve gastrectomy.  Visualized loops of large and small bowel demonstrate no evidence of obstruction or inflammatory change.  The appendix is unremarkable.  There is no free intraperitoneal air, portal venous gas or ascites.    The visualized osseous structures are intact.  There is a small fat containing umbilical hernia.  Impression: 1. No acute intra-abdominal abnormality identified on current exam.  2. Postoperative change of sleeve gastrectomy.  3. Ill-defined region of hypoattenuation about the falciform ligament suggestive of focal fatty deposition.  4. Additional findings as above.    Electronically signed by: Dory Vazquez MD  Date:    01/26/2024  Time:    05:33      I have reviewed prior labs, imaging, and notes.      Assessment:  1. Nausea and vomiting, unspecified vomiting type    2. Gastritis, Helicobacter pylori    3. S/P laparoscopic sleeve gastrectomy    4. Abdominal pain, unspecified abdominal location      S/P sleeve gastrectomy 12/2023. Nausea and vomiting since gastric sleeve. Has followed with surgeon who does not believe symptoms are related to surgery. Gallbladder sludge/polyp/stone noted with imaging.   H.pylori treated with quadruple therapy x 2. Eradication has not been confirmed. Abdominal pain over incision  site; corresponds more with abdominal wall pain.     Plan:  Orders Placed This Encounter    pantoprazole (PROTONIX) 40 MG tablet    sucralfate (CARAFATE) 1 gram tablet    Case Request Endoscopy: EGD (ESOPHAGOGASTRODUODENOSCOPY)     EGD. Biopsy for H.pylori.   Protonix 40mg daily  Carafate 1g TID  Conservative measures for abdominal pain such as heating pad and OTC lidocaine patches.     If H.pylori positive, consider treatment with Talicia.   If symptoms continue, consider further evaluation of gallbladder with HIDA  Can also consider upper GI series d/t recent surgery.       Plan of care discussed with patient who is in agreement and verbalized understanding.     I have explained the planned procedures to the patient.The risks, benefits and alternatives of the procedure were also explained in detail. Patient verbalized understanding, all questions were answered. The patient agrees to proceed as planned    Follow Up: ZANDRA Jackson, HARRIS,FNP-BC  [facility] [place]    (Portions of this note were dictated using voice recognition software and may contain dictation related errors in spelling/grammar/syntax not found on text review)

## 2024-02-09 NOTE — PATIENT INSTRUCTIONS
EGD   Pantoprazole once a day in the morning  Sucralfate three times a day. Dissolve 1 tablet in 1 tablespoon of water and drink.   Continue fiber  Salonpas lidocaine patches for abdominal pain

## 2024-03-11 RX ORDER — TETRACYCLINE HYDROCHLORIDE 500 MG/1
500 CAPSULE ORAL EVERY 6 HOURS
Qty: 56 CAPSULE | Refills: 0 | Status: SHIPPED | OUTPATIENT
Start: 2024-03-11 | End: 2024-03-25

## 2024-03-11 RX ORDER — METRONIDAZOLE 250 MG/1
250 TABLET ORAL EVERY 6 HOURS
Qty: 56 TABLET | Refills: 0 | Status: SHIPPED | OUTPATIENT
Start: 2024-03-11 | End: 2024-03-25

## 2024-03-11 RX ORDER — PANTOPRAZOLE SODIUM 40 MG/1
40 TABLET, DELAYED RELEASE ORAL 2 TIMES DAILY
Qty: 28 TABLET | Refills: 0 | Status: SHIPPED | OUTPATIENT
Start: 2024-03-11 | End: 2024-03-25

## 2024-11-26 DIAGNOSIS — Z12.31 ENCOUNTER FOR SCREENING MAMMOGRAM FOR MALIGNANT NEOPLASM OF BREAST: Primary | ICD-10-CM
